# Patient Record
Sex: MALE | Race: WHITE
[De-identification: names, ages, dates, MRNs, and addresses within clinical notes are randomized per-mention and may not be internally consistent; named-entity substitution may affect disease eponyms.]

---

## 2018-05-04 ENCOUNTER — HOSPITAL ENCOUNTER (EMERGENCY)
Dept: HOSPITAL 47 - EC | Age: 23
Discharge: HOME | End: 2018-05-04
Payer: COMMERCIAL

## 2018-05-04 VITALS
DIASTOLIC BLOOD PRESSURE: 57 MMHG | HEART RATE: 63 BPM | SYSTOLIC BLOOD PRESSURE: 117 MMHG | TEMPERATURE: 99.6 F | RESPIRATION RATE: 18 BRPM

## 2018-05-04 DIAGNOSIS — J06.9: Primary | ICD-10-CM

## 2018-05-04 DIAGNOSIS — Z88.6: ICD-10-CM

## 2018-05-04 PROCEDURE — 99283 EMERGENCY DEPT VISIT LOW MDM: CPT

## 2018-05-04 PROCEDURE — 87502 INFLUENZA DNA AMP PROBE: CPT

## 2018-05-04 PROCEDURE — 71046 X-RAY EXAM CHEST 2 VIEWS: CPT

## 2018-05-04 NOTE — XR
EXAM:

  XR Chest, 2 Views

 

CLINICAL HISTORY:

  ITS.REASON XR Reason: Pain

 

TECHNIQUE:

  Frontal and lateral views of the chest.

 

COMPARISON:

  No relevant prior studies available.

 

FINDINGS:

  Lungs:  Unremarkable.  No consolidation.

  Pleural space:  Unremarkable.  No pneumothorax.

  Heart:  Unremarkable.  No cardiomegaly.

  Mediastinum:  Unremarkable.

  Bones/joints:  Unremarkable.

 

IMPRESSION:     

No acute findings.

## 2018-05-04 NOTE — ED
General Adult HPI





- General


Chief complaint: Upper Respiratory Infection


Stated complaint: fever, cough,vomiting


Time Seen by Provider: 05/04/18 00:26


Source: patient


Mode of arrival: ambulatory


Limitations: no limitations





- History of Present Illness


Initial comments: 


22-year-old male patient presents to the emergency department today for 

complaints of fever and upper respiratory symptoms.  Patient states that 

starting yesterday he had cough, sore throat, nasal congestion.  Patient states 

that throughout the day today he has had a high fever.  States he did take cold 

medicine this morning but it didn't help his symptoms.  Patient states that he 

has vomited a couple times this afternoon as well.  Patient denies any chest 

pain, shortness of breath, abdominal pain, diarrhea or constipation.  Denies 

any sick contacts.  States he did not get a flu shot this season. Patient 

denies any recent rash, back pain, numbness, tingling, dizziness, weakness, 

hematuria, dysuria, urinary urgency, urinary frequency, visual changes, or any 

other complaints.








- Related Data


 Previous Rx's











 Medication  Instructions  Recorded


 


Sertraline [Zoloft] 50 mg PO DAILY #30 tab 04/22/15


 


traZODone HCL [Desyrel] 50 mg PO HS PRN #30 tab 04/22/15











 Allergies











Allergy/AdvReac Type Severity Reaction Status Date / Time


 


acetaminophen [From Tylenol] Allergy  Unknown Verified 05/04/18 00:20














Review of Systems


ROS Statement: 


Those systems with pertinent positive or pertinent negative responses have been 

documented in the HPI.





ROS Other: All systems not noted in ROS Statement are negative.





Past Medical History


Past Medical History: No Reported History


History of Any Multi-Drug Resistant Organisms: C-DIFF


Date of last positivie culture/infection: 2013


MDRO Source:: stool


Additional Past Surgical History / Comment(s): testicle surgery as a child.


Past Psychological History: No Psychological Hx Reported


Smoking Status: Never smoker


Past Alcohol Use History: None Reported


Past Drug Use History: None Reported





General Exam


Limitations: no limitations


General appearance: alert, in no apparent distress, other (This is a well-

developed, well-nourished adult male patient in no acute distress.  Vital signs 

upon presentation are temperature 102.5F, pulse 99, respirations 16, blood 

pressure 122/70, pulse ox 98% on room air.)


ENT exam: Present: normal exam, mucous membranes moist, TM's normal 

bilaterally.  Absent: normal oropharynx (Pharyngeal erythema)


Neck exam: Present: normal inspection.  Absent: tenderness, meningismus, 

lymphadenopathy


Respiratory exam: Present: normal lung sounds bilaterally.  Absent: respiratory 

distress, wheezes, rales, rhonchi, stridor


Cardiovascular Exam: Present: regular rate, normal rhythm, normal heart sounds.

  Absent: systolic murmur, diastolic murmur, rubs, gallop, clicks


GI/Abdominal exam: Present: soft, normal bowel sounds.  Absent: distended, 

tenderness, guarding, rebound, rigid


Neurological exam: Present: alert, oriented X3, CN II-XII intact


Psychiatric exam: Present: normal affect, normal mood


Skin exam: Present: warm, dry, intact, normal color, other (Flushed cheeks).  

Absent: rash





Course


 Vital Signs











  05/04/18 05/04/18





  00:14 01:52


 


Temperature 102.5 F H 99.6 F


 


Pulse Rate 99 63


 


Respiratory 16 18





Rate  


 


Blood Pressure 122/70 117/57


 


O2 Sat by Pulse 98 98





Oximetry  














Medical Decision Making





- Medical Decision Making


22-year-old male patient presents the emergency department today for complaints 

of fever, sore throat, cough, nasal congestion.  Physical examination shows 

lungs are clear to auscultation with good air movement.  There is pharyngeal 

erythema.  No lymphadenopathy noted.  Chest x-ray shows no acute 

cardiopulmonary process.  Findings are discussed with the patient.  Did inform 

him he most likely has viral upper respiratory infection given his symptoms.  

He is educated regarding fever management.  He is instructed to rest and 

increase fluids.  Return parameters discussed in detail.  He is instructed to 

follow-up with his primary care physician for recheck in 1-2 days.  He 

verbalizes understanding and agrees with this plan.








- Lab Data


 Lab Results











  05/04/18 Range/Units





  00:25 


 


Influenza Type A RNA  Not Detected  (Not Detectd)  


 


Influenza Type B (PCR)  Not Detected  (Not Detectd)  














- Radiology Data


Radiology results: report reviewed, image reviewed


Two-view x-ray of the chest is obtained, lungs are unremarkable no consolidation

, pleural spaces unremarkable no pneumothorax, heart is unremarkable no 

cardiomegaly, mediastinum is unremarkable, bones and joints are unremarkable.  

Impression by Dr. Jc shows no acute findings.











Disposition


Clinical Impression: 


 Viral upper respiratory illness





Disposition: HOME SELF-CARE


Condition: Good


Instructions:  Fever in Adults (ED), Upper Respiratory Infection (ED)


Additional Instructions: 


Rest.  Increase fluids.  Take 600 mg of ibuprofen every 6 hours.  Follow-up 

with your primary care physician for recheck in 1-2 days.  Return here 

immediately for any new, worsening, or concerning symptoms.


Is patient prescribed a controlled substance at d/c from ED?: No


Referrals: 


Shahid Mathew DO [Primary Care Provider] - 1-2 days


Time of Disposition: 01:51

## 2020-03-10 ENCOUNTER — HOSPITAL ENCOUNTER (EMERGENCY)
Dept: HOSPITAL 47 - EC | Age: 25
Discharge: HOME | End: 2020-03-10
Payer: COMMERCIAL

## 2020-03-10 VITALS — SYSTOLIC BLOOD PRESSURE: 122 MMHG | DIASTOLIC BLOOD PRESSURE: 61 MMHG | HEART RATE: 61 BPM

## 2020-03-10 VITALS — TEMPERATURE: 98 F | RESPIRATION RATE: 18 BRPM

## 2020-03-10 DIAGNOSIS — F32.9: Primary | ICD-10-CM

## 2020-03-10 DIAGNOSIS — Z88.8: ICD-10-CM

## 2020-03-10 PROCEDURE — 82075 ASSAY OF BREATH ETHANOL: CPT

## 2020-03-10 PROCEDURE — 99285 EMERGENCY DEPT VISIT HI MDM: CPT

## 2020-03-10 PROCEDURE — 80306 DRUG TEST PRSMV INSTRMNT: CPT

## 2020-03-10 NOTE — ED
General Adult HPI





- General


Source: patient, RN notes reviewed, old records reviewed





<Severiano Garcia - Last Filed: 03/10/20 01:38>





<Gómez Hutchinson - Last Filed: 03/10/20 04:38>





- General


Stated complaint: Mental Health


Time Seen by Provider: 03/10/20 01:20





- History of Present Illness


Initial comments: 





24-year-old male presenting for mental health evaluation.  Patient had made 

suicidal comments during an argument.  He states that he's had mental health 

issues in the past and had a suicide attempt proximally 5 years ago.  He admits 

that he took 2 doses of penicillin which is prescribed for tooth abscess.  This 

was interpreted by a friend of his as a suicide attempt.  Police were called and

the patient was transported to the emergency department by local police.  He has

not been petitioned, he's been cooperative throughout the process.  He 

specifically denies suicidal attempt or any other ingestion.  He does admit to 

some depression. (Severiano Garcia)





- Related Data


                                  Previous Rx's











 Medication  Instructions  Recorded


 


Sertraline [Zoloft] 50 mg PO DAILY #30 tab 04/22/15


 


traZODone HCL [Desyrel] 50 mg PO HS PRN #30 tab 04/22/15











                                    Allergies











Allergy/AdvReac Type Severity Reaction Status Date / Time


 


acetaminophen [From Tylenol] Allergy  Unknown Verified 05/04/18 00:20














Review of Systems


ROS Other: All systems not noted in ROS Statement are negative.





<Severiano Garcia - Last Filed: 03/10/20 01:38>


ROS Other: All systems not noted in ROS Statement are negative.





<Gómez Hutchinson - Last Filed: 03/10/20 04:38>


ROS Statement: 


Those systems with pertinent positive or pertinent negative responses have been 

documented in the HPI.








Past Medical History


Past Medical History: No Reported History


History of Any Multi-Drug Resistant Organisms: C-DIFF


Date of last positivie culture/infection: 2013


MDRO Source:: stool


Additional Past Surgical History / Comment(s): testicle surgery as a child.


Past Psychological History: No Psychological Hx Reported


Smoking Status: Never smoker


Past Alcohol Use History: None Reported


Past Drug Use History: None Reported





<Severiano Garcia - Last Filed: 03/10/20 01:38>





General Exam


General appearance: alert, in no apparent distress


Head exam: Present: atraumatic, normocephalic


Eye exam: Present: normal appearance, PERRL


ENT exam: Present: normal exam


Neck exam: Present: normal inspection.  Absent: tenderness, meningismus


Respiratory exam: Present: normal lung sounds bilaterally.  Absent: respiratory 

distress


Cardiovascular Exam: Present: regular rate, normal rhythm


GI/Abdominal exam: Present: soft.  Absent: distended, tenderness, guarding


Extremities exam: Present: normal inspection, normal capillary refill.  Absent: 

calf tenderness


Neurological exam: Present: alert, oriented X3


Psychiatric exam: Present: depressed.  Absent: suicidal ideation


Skin exam: Present: warm, dry, intact.  Absent: cyanosis, diaphoretic





<Severiano Garcia - Last Filed: 03/10/20 01:38>





Course





<Severiano Garcia - Last Filed: 03/10/20 01:38>





                                   Vital Signs











  03/10/20





  01:20


 


Temperature 98.0 F


 


Pulse Rate 66


 


Respiratory 18





Rate 


 


Blood Pressure 133/83


 


O2 Sat by Pulse 97





Oximetry 














- Reevaluation(s)


Reevaluation #1: 





03/10/20 01:38


Patient medically cleared, awaiting EPS evaluation.  Care signed out at shift 

change awaiting final disposition. (Severiano Garcia)





Medical Decision Making





- Lab Data





                                   Lab Results











  03/10/20 Range/Units





  01:46 


 


Urine Opiates Screen  Not Detected  (NotDetected)  


 


Ur Oxycodone Screen  Not Detected  (NotDetected)  


 


Urine Methadone Screen  Not Detected  (NotDetected)  


 


Ur Propoxyphene Screen  Not Detected  (NotDetected)  


 


Ur Barbiturates Screen  Not Detected  (NotDetected)  


 


U Tricyclic Antidepress  Not Detected  (NotDetected)  


 


Ur Phencyclidine Scrn  Not Detected  (NotDetected)  


 


Ur Amphetamines Screen  Not Detected  (NotDetected)  


 


U Methamphetamines Scrn  Not Detected  (NotDetected)  


 


U Benzodiazepines Scrn  Not Detected  (NotDetected)  


 


Urine Cocaine Screen  Not Detected  (NotDetected)  


 


U Marijuana (THC) Screen  Not Detected  (NotDetected)  














Disposition





<Severiano Garcia - Last Filed: 03/10/20 01:38>


Is patient prescribed a controlled substance at d/c from ED?: No





<Gómez Hutchinson - Last Filed: 03/10/20 04:38>


Clinical Impression: 


 Mood disorder





Disposition: HOME SELF-CARE


Condition: Good


Instructions (If sedation given, give patient instructions):  Mood Disorders 

(ED)


Referrals: 


Shahid Mathew DO [Primary Care Provider] - 1-2 days